# Patient Record
Sex: FEMALE | Race: WHITE | Employment: UNEMPLOYED | URBAN - METROPOLITAN AREA
[De-identification: names, ages, dates, MRNs, and addresses within clinical notes are randomized per-mention and may not be internally consistent; named-entity substitution may affect disease eponyms.]

---

## 2018-09-18 ENCOUNTER — HOSPITAL ENCOUNTER (OUTPATIENT)
Age: 48
Setting detail: OUTPATIENT SURGERY
Discharge: HOME | End: 2018-09-18
Attending: INTERNAL MEDICINE | Admitting: INTERNAL MEDICINE
Payer: COMMERCIAL

## 2018-09-18 VITALS
BODY MASS INDEX: 22.34 KG/M2 | HEIGHT: 66 IN | OXYGEN SATURATION: 99 % | HEART RATE: 60 BPM | RESPIRATION RATE: 16 BRPM | DIASTOLIC BLOOD PRESSURE: 60 MMHG | SYSTOLIC BLOOD PRESSURE: 106 MMHG | TEMPERATURE: 97.8 F | WEIGHT: 139 LBS

## 2018-09-18 LAB — PREGNANCY, URINE: NEGATIVE

## 2018-09-18 PROCEDURE — 99153 MOD SED SAME PHYS/QHP EA: CPT | Performed by: INTERNAL MEDICINE

## 2018-09-18 PROCEDURE — 99152 MOD SED SAME PHYS/QHP 5/>YRS: CPT | Performed by: INTERNAL MEDICINE

## 2018-09-18 PROCEDURE — 6360000002 HC RX W HCPCS: Performed by: INTERNAL MEDICINE

## 2018-09-18 PROCEDURE — 84703 CHORIONIC GONADOTROPIN ASSAY: CPT

## 2018-09-18 PROCEDURE — 3609009500 HC COLONOSCOPY DIAGNOSTIC OR SCREENING: Performed by: INTERNAL MEDICINE

## 2018-09-18 PROCEDURE — 7100000011 HC PHASE II RECOVERY - ADDTL 15 MIN: Performed by: INTERNAL MEDICINE

## 2018-09-18 PROCEDURE — 2580000003 HC RX 258: Performed by: INTERNAL MEDICINE

## 2018-09-18 PROCEDURE — 7100000010 HC PHASE II RECOVERY - FIRST 15 MIN: Performed by: INTERNAL MEDICINE

## 2018-09-18 PROCEDURE — 2500000003 HC RX 250 WO HCPCS: Performed by: INTERNAL MEDICINE

## 2018-09-18 RX ORDER — 0.9 % SODIUM CHLORIDE 0.9 %
500 INTRAVENOUS SOLUTION INTRAVENOUS ONCE
Status: COMPLETED | OUTPATIENT
Start: 2018-09-18 | End: 2018-09-18

## 2018-09-18 RX ORDER — M-VIT,TX,IRON,MINS/CALC/FOLIC 27MG-0.4MG
1 TABLET ORAL DAILY
COMMUNITY

## 2018-09-18 RX ORDER — MIDAZOLAM HYDROCHLORIDE 1 MG/ML
INJECTION INTRAMUSCULAR; INTRAVENOUS PRN
Status: DISCONTINUED | OUTPATIENT
Start: 2018-09-18 | End: 2018-09-18 | Stop reason: HOSPADM

## 2018-09-18 RX ORDER — MEPERIDINE HYDROCHLORIDE 50 MG/ML
INJECTION INTRAMUSCULAR; INTRAVENOUS; SUBCUTANEOUS PRN
Status: DISCONTINUED | OUTPATIENT
Start: 2018-09-18 | End: 2018-09-18 | Stop reason: HOSPADM

## 2018-09-18 RX ADMIN — SODIUM CHLORIDE 500 ML: 9 INJECTION, SOLUTION INTRAVENOUS at 10:51

## 2018-09-18 RX ADMIN — MEROPENEM 1 G: 1 INJECTION, POWDER, FOR SOLUTION INTRAVENOUS at 10:21

## 2018-09-18 ASSESSMENT — PAIN SCALES - GENERAL
PAINLEVEL_OUTOF10: 0
PAINLEVEL_OUTOF10: 0

## 2018-09-18 ASSESSMENT — PAIN - FUNCTIONAL ASSESSMENT: PAIN_FUNCTIONAL_ASSESSMENT: 0-10

## 2018-09-18 NOTE — PROGRESS NOTES
Michelle Roberts is a 50 y.o. female patient. No current facility-administered medications for this encounter. No Known Allergies  Active Problems:    * No active hospital problems. *  Resolved Problems:    * No resolved hospital problems. *    Blood pressure (!) 109/43, pulse 51, resp. rate 16, height 5' 6\" (1.676 m), weight 139 lb (63 kg), SpO2 100 %. Subjective:  Symptoms:  Stable. Diet:  Adequate intake. Activity level: Normal.    Pain:  She complains of pain that is mild. She reports pain is unchanged. Pain is partially controlled. (Rectal discomfort.). Objective:  General Appearance:  Well-appearing, comfortable and in no acute distress. Vital signs: (most recent): Blood pressure (!) 109/43, pulse 51, resp. rate 16, height 5' 6\" (1.676 m), weight 139 lb (63 kg), SpO2 100 %. Vital signs are normal.    Output: Producing urine and producing stool (Chronic constipation over the past \"few months\".). HEENT: Normal HEENT exam.  (Wears glasses)    Heart: Normal rate. Abdomen: Abdomen is soft. There is no abdominal tenderness. Extremities: Normal range of motion. Neurological: Patient is alert and oriented to person, place and time. Normal strength. Skin:  Warm and dry.       Assessment & Plan    Manda Sepulveda RN  9/18/2018